# Patient Record
Sex: FEMALE | Race: WHITE | NOT HISPANIC OR LATINO | Employment: STUDENT | URBAN - NONMETROPOLITAN AREA
[De-identification: names, ages, dates, MRNs, and addresses within clinical notes are randomized per-mention and may not be internally consistent; named-entity substitution may affect disease eponyms.]

---

## 2017-01-24 ENCOUNTER — OFFICE VISIT (OUTPATIENT)
Dept: SURGERY | Facility: CLINIC | Age: 32
End: 2017-01-24

## 2017-01-24 VITALS
DIASTOLIC BLOOD PRESSURE: 80 MMHG | WEIGHT: 194 LBS | BODY MASS INDEX: 31.18 KG/M2 | HEIGHT: 66 IN | SYSTOLIC BLOOD PRESSURE: 124 MMHG

## 2017-01-24 DIAGNOSIS — N63.0 LUMP OR MASS IN BREAST: ICD-10-CM

## 2017-01-24 DIAGNOSIS — N63.0 BREAST MASS: Primary | ICD-10-CM

## 2017-01-24 PROCEDURE — 99213 OFFICE O/P EST LOW 20 MIN: CPT | Performed by: SURGERY

## 2017-01-24 NOTE — MR AVS SNAPSHOT
Mindy Helm   2017 10:00 AM   Office Visit    Dept Phone:  [phone]   Encounter #:  [id number]    Provider:  Maynor Grace MD   Department:  [facility] GENERAL SURGERY                Your Full Care Plan              Today's Medication Changes          These changes are accurate as of: 17 11:04 AM.  If you have any questions, ask your nurse or doctor.               Stop taking medication(s)listed here:     ketotifen 0.025 % ophthalmic solution   Commonly known as:  ZADITOR   Stopped by:  Maynor Grace MD                      Your Updated Medication List          This list is accurate as of: 17 11:04 AM.  Always use your most recent med list.                fluticasone 50 MCG/ACT nasal spray   Commonly known as:  FLONASE               You Were Diagnosed With        Codes Comments    Breast mass    -  Primary ICD-10-CM: N63  ICD-9-CM: 611.72       Instructions     None    Patient Instructions History      Upcoming Appointments     Visit Type Date Time Department    OFFICE VISIT 2017 10:00 AM [facility] GEN SURGERY [facility] BREAST RIGHT LIMITED 2017  3:30 PM [facility] WOMENS CTR [facility]    OFFICE VISIT 2017  3:35 PM [facility]     [facility] Qubrit allows you to send messages to your doctor, view your test results, renew your prescriptions, schedule appointments, and more. To sign up, go to amcure and click on the Sign Up Now link in the New User? box. Enter your Qubrit Activation Code exactly as it appears below along with the last four digits of your Social Security Number and your Date of Birth () to complete the sign-up process. If you do not sign up before the expiration date, you must request a new code.    Qubrit Activation Code: [email or web address]  Expires: 2017 11:04 AM    If you have questions, you can email [email or web address] or call [phone] to talk  "to our MyChart staff. Remember, [employer] is NOT to be used for urgent needs. For medical emergencies, dial 911.               Other Info from Your Visit           Your Appointments     Jan 26, 2017  3:30 PM CST   US mad breast right limited with MAD DIAG US 1   [facility] CENTER ULTRASOUND ([facility]    [address] [place] [id number] [phone]           No prep required            Jan 27, 2017  3:35 PM CST   Office Visit with Maynor Grace MD   [facility] MEDICAL [facility] GENERAL SURGERY (--)    [address] [place] [address] Dr  [facility] [address] [place] [id number] [phone]           Arrive 15 minutes prior to appointment.              Allergies     No Known Allergies      Reason for Visit     Follow-up Phil Lymph Node Rt. Axilla/Breast      Vital Signs     Blood Pressure Height Weight Body Mass Index Smoking Status       124/80 66\" (167.6 cm) 194 lb (88 kg) 31.31 kg/m2 Current Every Day Smoker       Problems and Diagnoses Noted     Breast mass    -  Primary        "

## 2017-01-24 NOTE — PROGRESS NOTES
31-year-old female who is now 2 years out from a right breast mammotome biopsy that was found to be a reactive lymph node.  At that time patient felt something in the upper outer aspect of her right breast and we thought that this was the lymph node that we did the biopsy on.  She tells me however that the feeling did not change within the biopsy and she remains concerned about this area.  Again it did not get bigger or change in any size with the biopsy that we did.  It is not changed by her history or exam herself.  No family history of breast cancer.  No other palpable bladder bowel is no nipple discharge    Social History     Social History   • Marital status:      Spouse name: N/A   • Number of children: N/A   • Years of education: N/A     Occupational History   • Not on file.     Social History Main Topics   • Smoking status: Current Every Day Smoker   • Smokeless tobacco: Not on file   • Alcohol use Not on file   • Drug use: Not on file   • Sexual activity: Not on file     Other Topics Concern   • Not on file     Social History Narrative       Past Medical History   Diagnosis Date   • Abnormal involuntary movement      tremor left hand   •       probable complete   • Acne vulgaris    • Acute atopic conjunctivitis    • Allergic rhinitis    • Breast lump       rt breast upper outer quadrant reactive lymph node on bx      • Bruxism      teeth grinding   • Contusion      nose   • Hyperlipidemia    • Indigestion    • Malaise and fatigue    • Motion sickness    • Muscle pain    • Near syncope    • Primary fibromyalgia syndrome    • Screening for malignant neoplasm of breast    • Screening for malignant neoplasm of cervix    • Spasm of back muscles    • Tobacco dependence syndrome    • UTI (urinary tract infection)        Family History   Problem Relation Age of Onset   • Heart disease Other    • Hypertension Other        Past Surgical History   Procedure Laterality Date   • Induced    2006     Hysterotomy, that is repeat low transverse  section. Intrauterine pregnancy, 34 and 1/7th weeks gestational age, previous low transverse  section times 2 with intrauterine fetal demise   • Breast biopsy  02/10/2015     Ultrasound guided right breast mammotome biopsy with 8 gauge needle, also left a clip   •  section  2004     Repeat low transverse  section. Intrauterine pregnancy at 39 and 1/7th weeks geatational age delivery. Previous low transverse  section       Alert and appropriate   nonicteric  Right breast without any dominant masses nipple discharge or skin changes.  I examined her sitting up and   supine and do not feel a distinct mass where she feels this area.  She does have some thickened breast tissue at the site however and is in the upper outer quadrant of her right breast.  No adenopathy no nipplel discharge    Assessment and plan  Questionable right upper outer quadrant breast mass that patient feels and is not very impressive on physical exam.  We will get an ultrasound of that area of the patient follow up with us after the procedure or sooner she has any other concerns or questions

## 2017-01-27 ENCOUNTER — OFFICE VISIT (OUTPATIENT)
Dept: SURGERY | Facility: CLINIC | Age: 32
End: 2017-01-27

## 2017-01-27 VITALS
BODY MASS INDEX: 31.66 KG/M2 | HEIGHT: 66 IN | SYSTOLIC BLOOD PRESSURE: 122 MMHG | DIASTOLIC BLOOD PRESSURE: 76 MMHG | WEIGHT: 197 LBS

## 2017-01-27 DIAGNOSIS — N63.0 LUMP OR MASS IN BREAST: Primary | ICD-10-CM

## 2017-01-27 PROCEDURE — 99212 OFFICE O/P EST SF 10 MIN: CPT | Performed by: SURGERY

## 2017-01-27 RX ORDER — NEOMYCIN/POLYMYXIN B/PRAMOXINE 3.5-10K-1
CREAM (GRAM) TOPICAL
COMMUNITY
End: 2017-10-25

## 2017-01-27 NOTE — PROGRESS NOTES
31-year-old female returns after a ultrasound done of the upper outer aspect of her right breast.  Patient was concerned about a palpable area that she could feel it's been unchanged over the past 2 years.  On my exam it does not appear to be any type of dominant mass just a thickened area of breast tissue.  Patient's had a lymph node biopsied in this area in the distant past by mammotome biopsy technique.  The ultrasound films reviewed as well as report and there is not anything present in the area of concern.  Patient assures me that she told ultrasound tech look.  I went over these findings with her.  She understands.  I recommended annual breast exam with her physician and continues her self breast exam on a monthly basis.  Return to clinic she has any concerns or questions

## 2017-10-25 ENCOUNTER — OFFICE VISIT (OUTPATIENT)
Dept: FAMILY MEDICINE CLINIC | Facility: CLINIC | Age: 32
End: 2017-10-25

## 2017-10-25 VITALS
DIASTOLIC BLOOD PRESSURE: 78 MMHG | HEIGHT: 66 IN | BODY MASS INDEX: 35.6 KG/M2 | SYSTOLIC BLOOD PRESSURE: 132 MMHG | WEIGHT: 221.5 LBS | HEART RATE: 73 BPM | OXYGEN SATURATION: 98 %

## 2017-10-25 DIAGNOSIS — Z23 NEEDS FLU SHOT: ICD-10-CM

## 2017-10-25 DIAGNOSIS — J30.2 CHRONIC SEASONAL ALLERGIC RHINITIS, UNSPECIFIED TRIGGER: ICD-10-CM

## 2017-10-25 DIAGNOSIS — Z23 NEED FOR TDAP VACCINATION: ICD-10-CM

## 2017-10-25 DIAGNOSIS — Z11.1 VISIT FOR TB SKIN TEST: ICD-10-CM

## 2017-10-25 DIAGNOSIS — Z76.89 ENCOUNTER TO ESTABLISH CARE: Primary | ICD-10-CM

## 2017-10-25 PROCEDURE — 86580 TB INTRADERMAL TEST: CPT | Performed by: FAMILY MEDICINE

## 2017-10-25 PROCEDURE — 99213 OFFICE O/P EST LOW 20 MIN: CPT | Performed by: FAMILY MEDICINE

## 2017-10-25 PROCEDURE — 90656 IIV3 VACC NO PRSV 0.5 ML IM: CPT | Performed by: FAMILY MEDICINE

## 2017-10-25 PROCEDURE — 90471 IMMUNIZATION ADMIN: CPT | Performed by: FAMILY MEDICINE

## 2017-10-25 RX ORDER — FLUTICASONE PROPIONATE 50 MCG
2 SPRAY, SUSPENSION (ML) NASAL DAILY
Qty: 1 BOTTLE | Refills: 0 | Status: SHIPPED | OUTPATIENT
Start: 2017-10-25 | End: 2018-01-31 | Stop reason: SDUPTHER

## 2017-10-25 NOTE — PROGRESS NOTES
I have seen the patient.  I have reviewed the notes, assessments, and/or procedures performed by Dr. Saba, I concur with her/his documentation and assessment and plan for Mindy Helm.          This document has been electronically signed by Cleopatra Kim MD on October 25, 2017 4:58 PM

## 2017-10-25 NOTE — PROGRESS NOTES
Subjective:     Mindy Helm is a 31 y.o. female who presents for initial evaluation for establishment of care. PHQ9 of 2. Pt has a history of seasonal allergies that she takes flonase for on a PRN basis which controls her symptoms. 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 Pt to get TDAP at the health department. Pt requires a TB skin test, MMR and Hep B titers. Pt is do for a papsmear and plans on scheduling one at a future visit. Pt request refill of flonase. Pt has no other current complaints. Pt to return in 2 days for TB skin test results.    Preventative:  Over the past 2 weeks, have you felt down, depressed, or hopeless?No   Over the past 2 weeks, have you felt little interest or pleasure in doing things?No  Clinical depression screening refused by patient.No     Past Medical Hx:  Past Medical History:   Diagnosis Date   • Abnormal involuntary movement     tremor left hand   •      probable complete   • Acne vulgaris    • Acute atopic conjunctivitis    • Allergic rhinitis    • Breast lump      rt breast upper outer quadrant reactive lymph node on bx      • Bruxism     teeth grinding   • Contusion     nose   • Hyperlipidemia    • Indigestion    • Malaise and fatigue    • Motion sickness    • Muscle pain    • Near syncope    • Primary fibromyalgia syndrome    • Screening for malignant neoplasm of breast    • Screening for malignant neoplasm of cervix    • Spasm of back muscles    • Tobacco dependence syndrome    • UTI (urinary tract infection)        Past Surgical Hx:  Past Surgical History:   Procedure Laterality Date   • BREAST BIOPSY  02/10/2015    Ultrasound guided right breast mammotome biopsy with 8 gauge needle, also left a clip   •  SECTION  2004    Repeat low transverse  section. Intrauterine pregnancy at 39 and 1/7th weeks geatational age delivery. Previous low transverse  section   • INDUCED    2006    Hysterotomy, that is repeat low transverse  section. Intrauterine pregnancy, 34 and 1/7th weeks gestational age, previous low transverse  section times 2 with intrauterine fetal demise       Health Maintenance:  Health Maintenance   Topic Date Due   • PNEUMOCOCCAL VACCINE (19-64 MEDIUM RISK) (1 of 1 - PPSV23) 10/29/2003   • TDAP/TD VACCINES (1 - Tdap) 10/29/2003   • PAP SMEAR  2017   • LIPID PANEL  2017   • INFLUENZA VACCINE  Completed       Current Meds:    Current Outpatient Prescriptions:   •  fluticasone (FLONASE) 50 MCG/ACT nasal spray, 2 sprays into each nostril Daily., Disp: 1 bottle, Rfl: 0  •  Multiple Vitamin (MULTIVITAMINS PO), Take 1 tablet by mouth Daily., Disp: , Rfl:     Allergies:  Review of patient's allergies indicates no known allergies.    Family Hx:  Family History   Problem Relation Age of Onset   • Heart disease Other    • Hypertension Other         Social History:  Social History     Social History   • Marital status:      Spouse name: N/A   • Number of children: N/A   • Years of education: N/A     Occupational History   • Not on file.     Social History Main Topics   • Smoking status: Current Every Day Smoker   • Smokeless tobacco: Not on file   • Alcohol use No   • Drug use: Not on file   • Sexual activity: Not on file     Other Topics Concern   • Not on file     Social History Narrative       Review of Systems  Review of Systems   Constitutional: Negative for chills and fever.   HENT: Negative for congestion and sore throat.    Eyes: Negative for discharge and itching.   Respiratory: Negative for cough, shortness of breath and wheezing.    Cardiovascular: Negative for chest pain, palpitations and leg swelling.   Gastrointestinal: Negative for abdominal pain, diarrhea and vomiting.   Genitourinary: Negative for dysuria and hematuria.   Musculoskeletal: Negative for neck pain and neck stiffness.   Skin: Negative for rash and wound.  "  Neurological: Negative for seizures and syncope.   Psychiatric/Behavioral: Negative for agitation and confusion.         Objective:     /78 (BP Location: Left arm, Patient Position: Sitting, Cuff Size: Adult)  Pulse 73  Ht 66\" (167.6 cm)  Wt 221 lb 8 oz (100 kg)  SpO2 98%  BMI 35.75 kg/m2  Physical Exam   Constitutional: She is oriented to person, place, and time. She appears well-developed and well-nourished. No distress.   HENT:   Head: Normocephalic and atraumatic.   Right Ear: External ear normal.   Left Ear: External ear normal.   Nose: Nose normal.   Mouth/Throat: Oropharynx is clear and moist. No oropharyngeal exudate.   Eyes: Conjunctivae and EOM are normal. Pupils are equal, round, and reactive to light. Right eye exhibits no discharge. Left eye exhibits no discharge. No scleral icterus.   Neck: Normal range of motion. Neck supple.   Cardiovascular: Normal rate, regular rhythm and normal heart sounds.    Pulmonary/Chest: Effort normal and breath sounds normal. No respiratory distress. She has no wheezes. She has no rales.   Abdominal: Soft. Bowel sounds are normal. She exhibits no distension. There is no tenderness.   Musculoskeletal: Normal range of motion. She exhibits no edema.   Neurological: She is alert and oriented to person, place, and time.   Skin: Skin is warm and dry. She is not diaphoretic.   Psychiatric: She has a normal mood and affect. Her behavior is normal. Judgment and thought content normal.   Nursing note and vitals reviewed.                                                                     Assessment/Plan:     Diagnoses and all orders for this visit:    Encounter to establish care  -     Measles / Mumps / Rubella Immunity; Future  -     Hepatitis B Surface Antibody; Future  -     Hepatitis B surface antigen; Future    Chronic seasonal allergic rhinitis, unspecified trigger  -     fluticasone (FLONASE) 50 MCG/ACT nasal spray; 2 sprays into each nostril Daily.    Needs flu " shot  -     Flu Vaccine Quad PF >18YR    Need for Tdap vaccination    Visit for TB skin test  -     TB Skin Test         Follow-up:     Return in about 2 days (around 10/27/2017) for Next scheduled follow up.        GOALS:  Maintain medication compliance    Preventative:  Female Preventative: Exercises regularly  Vaccines:   Tetanus vaccine: not up to date - will obtain at health department  Annual influenza vaccine: up to date   Pneumococcal vaccine: not up to date - declined    Smoking cessation counseling was provided.  does not drink  eat more fruits and vegetables, decrease soda or juice intake, increase water intake and increase physical activity    RISK SCORE: 2    Melvin Saba MD PGY2  [place] [address]  Office: [phone]      This document has been electronically signed by Melvin Saba MD on October 27, 2017 8:23 AM

## 2017-10-27 LAB
INDURATION: 0 MM (ref 0–10)
TB SKIN TEST: NEGATIVE

## 2017-11-06 DIAGNOSIS — Z11.1 ENCOUNTER FOR PPD SKIN TEST READING: Primary | ICD-10-CM

## 2017-11-06 PROCEDURE — 86580 TB INTRADERMAL TEST: CPT | Performed by: FAMILY MEDICINE

## 2017-11-15 ENCOUNTER — TELEPHONE (OUTPATIENT)
Dept: FAMILY MEDICINE CLINIC | Facility: CLINIC | Age: 32
End: 2017-11-15

## 2017-11-15 NOTE — TELEPHONE ENCOUNTER
Patient is needing where her second tb test result was the same as the first results are not in the computer and she needs the results for school.

## 2017-11-20 ENCOUNTER — TELEPHONE (OUTPATIENT)
Dept: FAMILY MEDICINE CLINIC | Facility: CLINIC | Age: 32
End: 2017-11-20

## 2017-11-20 LAB
INDURATION: 0 MM (ref 0–10)
TB SKIN TEST: NEGATIVE

## 2017-11-20 NOTE — TELEPHONE ENCOUNTER
PT CALLED NEEDING SECOND RESULT FOR TB TEST. PT STATES SHE HAD THIS DONE 11-6-17. PT NEEDS THIS FOR SCHOOL.

## 2018-01-31 DIAGNOSIS — J30.2 CHRONIC SEASONAL ALLERGIC RHINITIS, UNSPECIFIED TRIGGER: ICD-10-CM

## 2018-01-31 RX ORDER — FLUTICASONE PROPIONATE 50 MCG
SPRAY, SUSPENSION (ML) NASAL
Qty: 1 BOTTLE | Refills: 1 | Status: SHIPPED | OUTPATIENT
Start: 2018-01-31 | End: 2020-09-10 | Stop reason: SDUPTHER

## 2018-03-13 ENCOUNTER — OFFICE VISIT (OUTPATIENT)
Dept: FAMILY MEDICINE CLINIC | Facility: CLINIC | Age: 33
End: 2018-03-13

## 2018-03-13 ENCOUNTER — LAB (OUTPATIENT)
Dept: LAB | Facility: HOSPITAL | Age: 33
End: 2018-03-13

## 2018-03-13 VITALS
DIASTOLIC BLOOD PRESSURE: 66 MMHG | WEIGHT: 229.38 LBS | HEIGHT: 66 IN | OXYGEN SATURATION: 97 % | HEART RATE: 70 BPM | SYSTOLIC BLOOD PRESSURE: 116 MMHG | BODY MASS INDEX: 36.86 KG/M2

## 2018-03-13 DIAGNOSIS — J06.9 VIRAL URI WITH COUGH: Primary | ICD-10-CM

## 2018-03-13 DIAGNOSIS — Z00.00 ANNUAL PHYSICAL EXAM: ICD-10-CM

## 2018-03-13 LAB
25(OH)D3 SERPL-MCNC: 24.7 NG/ML (ref 30–100)
ALBUMIN SERPL-MCNC: 4.2 G/DL (ref 3.4–4.8)
ALBUMIN/GLOB SERPL: 1.2 G/DL (ref 1.1–1.8)
ALP SERPL-CCNC: 72 U/L (ref 38–126)
ALT SERPL W P-5'-P-CCNC: 32 U/L (ref 9–52)
ANION GAP SERPL CALCULATED.3IONS-SCNC: 12 MMOL/L (ref 5–15)
ARTICHOKE IGE QN: 145 MG/DL (ref 1–129)
AST SERPL-CCNC: 23 U/L (ref 14–36)
BASOPHILS # BLD AUTO: 0.02 10*3/MM3 (ref 0–0.2)
BASOPHILS NFR BLD AUTO: 0.2 % (ref 0–2)
BILIRUB SERPL-MCNC: 0.2 MG/DL (ref 0.2–1.3)
BUN BLD-MCNC: 7 MG/DL (ref 7–21)
BUN/CREAT SERPL: 9.2 (ref 7–25)
CALCIUM SPEC-SCNC: 9.1 MG/DL (ref 8.4–10.2)
CHLORIDE SERPL-SCNC: 99 MMOL/L (ref 95–110)
CHOLEST SERPL-MCNC: 214 MG/DL (ref 0–199)
CO2 SERPL-SCNC: 28 MMOL/L (ref 22–31)
CREAT BLD-MCNC: 0.76 MG/DL (ref 0.5–1)
DEPRECATED RDW RBC AUTO: 39 FL (ref 36.4–46.3)
EOSINOPHIL # BLD AUTO: 0.24 10*3/MM3 (ref 0–0.7)
EOSINOPHIL NFR BLD AUTO: 2.6 % (ref 0–7)
ERYTHROCYTE [DISTWIDTH] IN BLOOD BY AUTOMATED COUNT: 12.9 % (ref 11.5–14.5)
GFR SERPL CREATININE-BSD FRML MDRD: 88 ML/MIN/1.73 (ref 60–149)
GLOBULIN UR ELPH-MCNC: 3.6 GM/DL (ref 2.3–3.5)
GLUCOSE BLD-MCNC: 91 MG/DL (ref 60–100)
HCT VFR BLD AUTO: 38.3 % (ref 35–45)
HDLC SERPL-MCNC: 33 MG/DL (ref 60–200)
HGB BLD-MCNC: 13 G/DL (ref 12–15.5)
IMM GRANULOCYTES # BLD: 0.01 10*3/MM3 (ref 0–0.02)
IMM GRANULOCYTES NFR BLD: 0.1 % (ref 0–0.5)
LDLC/HDLC SERPL: 4.34 {RATIO} (ref 0–3.22)
LYMPHOCYTES # BLD AUTO: 3.07 10*3/MM3 (ref 0.6–4.2)
LYMPHOCYTES NFR BLD AUTO: 32.7 % (ref 10–50)
MCH RBC QN AUTO: 28.3 PG (ref 26.5–34)
MCHC RBC AUTO-ENTMCNC: 33.9 G/DL (ref 31.4–36)
MCV RBC AUTO: 83.4 FL (ref 80–98)
MONOCYTES # BLD AUTO: 0.53 10*3/MM3 (ref 0–0.9)
MONOCYTES NFR BLD AUTO: 5.7 % (ref 0–12)
NEUTROPHILS # BLD AUTO: 5.51 10*3/MM3 (ref 2–8.6)
NEUTROPHILS NFR BLD AUTO: 58.7 % (ref 37–80)
PLATELET # BLD AUTO: 334 10*3/MM3 (ref 150–450)
PMV BLD AUTO: 9.9 FL (ref 8–12)
POTASSIUM BLD-SCNC: 3.9 MMOL/L (ref 3.5–5.1)
PROT SERPL-MCNC: 7.8 G/DL (ref 6.3–8.6)
RBC # BLD AUTO: 4.59 10*6/MM3 (ref 3.77–5.16)
SODIUM BLD-SCNC: 139 MMOL/L (ref 137–145)
TRIGL SERPL-MCNC: 189 MG/DL (ref 20–199)
WBC NRBC COR # BLD: 9.38 10*3/MM3 (ref 3.2–9.8)

## 2018-03-13 PROCEDURE — 36415 COLL VENOUS BLD VENIPUNCTURE: CPT | Performed by: FAMILY MEDICINE

## 2018-03-13 PROCEDURE — 80061 LIPID PANEL: CPT

## 2018-03-13 PROCEDURE — 85025 COMPLETE CBC W/AUTO DIFF WBC: CPT | Performed by: FAMILY MEDICINE

## 2018-03-13 PROCEDURE — 99214 OFFICE O/P EST MOD 30 MIN: CPT | Performed by: FAMILY MEDICINE

## 2018-03-13 PROCEDURE — 80053 COMPREHEN METABOLIC PANEL: CPT | Performed by: FAMILY MEDICINE

## 2018-03-13 PROCEDURE — 82306 VITAMIN D 25 HYDROXY: CPT

## 2018-03-13 RX ORDER — ECHINACEA PURPUREA EXTRACT 125 MG
1 TABLET ORAL 4 TIMES DAILY
Refills: 0 | COMMUNITY
Start: 2018-02-11

## 2018-03-13 RX ORDER — GUAIFENESIN 600 MG/1
1200 TABLET, EXTENDED RELEASE ORAL EVERY 12 HOURS SCHEDULED
Qty: 30 TABLET | Refills: 0 | Status: SHIPPED | OUTPATIENT
Start: 2018-03-13

## 2018-03-13 NOTE — PROGRESS NOTES
Subjective:     Mindy Helm is a 32 y.o. female who presents for initial evaluation for 3-4 day history of cough and congestion. Pt reports that she has been taking mucinex, flonase and saline nose spray. Pt denies cough, body aches, postnasal drip, rhinorrhea, fever, nausea or vomiting. Pt requests refill of her mucinex. Pt counseled likely a viral URI and counseled on supportive care measures. Pt requests this visit also be her annual visit and requests her yearly blood work. Pt is agreeable to CBC, CMP and lipid panel. Pt agreeable to scheduling next appointment for pap smear.    Preventative:  Over the past 2 weeks, have you felt down, depressed, or hopeless?No   Over the past 2 weeks, have you felt little interest or pleasure in doing things?No  Clinical depression screening refused by patient.No     Past Medical Hx:  Past Medical History:   Diagnosis Date   • Abnormal involuntary movement     tremor left hand   •      probable complete   • Acne vulgaris    • Acute atopic conjunctivitis    • Allergic rhinitis    • Breast lump      rt breast upper outer quadrant reactive lymph node on bx      • Bruxism     teeth grinding   • Contusion     nose   • Hyperlipidemia    • Indigestion    • Malaise and fatigue    • Motion sickness    • Muscle pain    • Near syncope    • Primary fibromyalgia syndrome    • Screening for malignant neoplasm of breast    • Screening for malignant neoplasm of cervix    • Spasm of back muscles    • Tobacco dependence syndrome    • UTI (urinary tract infection)        Past Surgical Hx:  Past Surgical History:   Procedure Laterality Date   • BREAST BIOPSY  02/10/2015    Ultrasound guided right breast mammotome biopsy with 8 gauge needle, also left a clip   •  SECTION  2004    Repeat low transverse  section. Intrauterine pregnancy at 39 and 1/7th weeks geatational age delivery. Previous low transverse  section   • INDUCED   2006     Hysterotomy, that is repeat low transverse  section. Intrauterine pregnancy, 34 and 1/7th weeks gestational age, previous low transverse  section times 2 with intrauterine fetal demise       Health Maintenance:  Health Maintenance   Topic Date Due   • TDAP/TD VACCINES (1 - Tdap) 10/29/2003   • PAP SMEAR  2017   • PNEUMOCOCCAL VACCINE (19-64 MEDIUM RISK) (1 of 1 - PPSV23) 2019 (Originally 10/29/2003)   • LIPID PANEL  2019   • INFLUENZA VACCINE  Completed       Current Meds:    Current Outpatient Prescriptions:   •  fluticasone (FLONASE) 50 MCG/ACT nasal spray, 2 SPRAYS IN EACH NOSTRIL ONCE DAILY, Disp: 1 bottle, Rfl: 1  •  KRILL OIL PO, Take 1 tablet by mouth Daily., Disp: , Rfl:   •  Multiple Vitamin (MULTIVITAMINS PO), Take 1 tablet by mouth Daily., Disp: , Rfl:   •  sodium chloride (OCEAN) 0.65 % nasal spray, 1 spray into each nostril 4 (Four) Times a Day., Disp: , Rfl: 0  •  guaiFENesin (MUCINEX) 600 MG 12 hr tablet, Take 2 tablets by mouth Every 12 (Twelve) Hours., Disp: 30 tablet, Rfl: 0    Allergies:  Review of patient's allergies indicates no known allergies.    Family Hx:  Family History   Problem Relation Age of Onset   • Heart disease Other    • Hypertension Other         Social History:  Social History     Social History   • Marital status:      Spouse name: N/A   • Number of children: N/A   • Years of education: N/A     Occupational History   • Not on file.     Social History Main Topics   • Smoking status: Current Every Day Smoker   • Smokeless tobacco: Not on file   • Alcohol use No   • Drug use: Unknown   • Sexual activity: Not on file     Other Topics Concern   • Not on file     Social History Narrative   • No narrative on file       Review of Systems  Review of Systems   Constitutional: Negative for chills and fever.   HENT: Positive for congestion. Negative for rhinorrhea.    Eyes: Negative for photophobia and visual disturbance.   Respiratory: Positive for  "cough. Negative for shortness of breath and wheezing.    Cardiovascular: Negative for chest pain and palpitations.   Gastrointestinal: Negative for abdominal pain, diarrhea, nausea and vomiting.   Genitourinary: Negative for dysuria and hematuria.   Musculoskeletal: Negative for neck pain and neck stiffness.   Skin: Negative for rash and wound.   Neurological: Negative for seizures and syncope.   Psychiatric/Behavioral: Negative for agitation and confusion.         Objective:     /66 (BP Location: Left arm, Patient Position: Sitting, Cuff Size: Large Adult)   Pulse 70   Ht 167.6 cm (66\")   Wt 104 kg (229 lb 6 oz)   SpO2 97%   BMI 37.02 kg/m²   Physical Exam   Constitutional: She is oriented to person, place, and time. She appears well-developed and well-nourished. No distress.   HENT:   Head: Normocephalic and atraumatic.   Right Ear: External ear normal.   Left Ear: External ear normal.   Nose: Nose normal.   Mouth/Throat: Oropharynx is clear and moist. No oropharyngeal exudate.   Eyes: Conjunctivae and EOM are normal. Pupils are equal, round, and reactive to light. Right eye exhibits no discharge. Left eye exhibits no discharge. No scleral icterus.   Neck: Normal range of motion. Neck supple. No thyromegaly present.   Cardiovascular: Normal rate and normal heart sounds.    Pulmonary/Chest: Effort normal and breath sounds normal. No respiratory distress. She has no wheezes. She has no rales.   Abdominal: Soft. Bowel sounds are normal. There is no tenderness.   Musculoskeletal: Normal range of motion. She exhibits no edema.   Lymphadenopathy:     She has no cervical adenopathy.   Neurological: She is alert and oriented to person, place, and time.   Skin: Skin is warm and dry. She is not diaphoretic.   Psychiatric: She has a normal mood and affect. Her behavior is normal. Judgment and thought content normal.   Nursing note and vitals reviewed.                                                                "      Assessment/Plan:     Diagnoses and all orders for this visit:    Viral URI with cough  -     guaiFENesin (MUCINEX) 600 MG 12 hr tablet; Take 2 tablets by mouth Every 12 (Twelve) Hours.  -     CBC & Differential    Annual physical exam  -     Comprehensive Metabolic Panel  -     Lipid panel; Future  -     Vitamin D 25 hydroxy; Future  -     CBC & Differential       Follow-up:     Return in about 4 weeks (around 4/10/2018) for Next scheduled follow up PAP.        GOALS:  Maintain medication compliance    Preventative:  Female Preventative: Cholesterol screening is up to date  Vaccines:   Tetanus vaccine: up to date  Annual influenza vaccine: up to date     Smoking cessation counseling was provided.  does not drink  eat more fruits and vegetables, decrease soda or juice intake, increase water intake and increase physical activity    RISK SCORE: 3    Melvin Saba MD PGY2  [place] [address]  Office: [phone]      This document has been electronically signed by Melvin Saba MD on March 16, 2018 1:36 PM

## 2018-03-19 ENCOUNTER — TELEPHONE (OUTPATIENT)
Dept: FAMILY MEDICINE CLINIC | Facility: CLINIC | Age: 33
End: 2018-03-19

## 2020-09-10 ENCOUNTER — OFFICE VISIT (OUTPATIENT)
Dept: FAMILY MEDICINE CLINIC | Facility: CLINIC | Age: 35
End: 2020-09-10

## 2020-09-10 ENCOUNTER — LAB (OUTPATIENT)
Dept: LAB | Facility: HOSPITAL | Age: 35
End: 2020-09-10

## 2020-09-10 VITALS
HEIGHT: 66 IN | HEART RATE: 71 BPM | OXYGEN SATURATION: 98 % | SYSTOLIC BLOOD PRESSURE: 128 MMHG | DIASTOLIC BLOOD PRESSURE: 74 MMHG | BODY MASS INDEX: 42.01 KG/M2 | WEIGHT: 261.4 LBS | TEMPERATURE: 96.8 F

## 2020-09-10 DIAGNOSIS — J30.2 CHRONIC SEASONAL ALLERGIC RHINITIS: ICD-10-CM

## 2020-09-10 DIAGNOSIS — Z11.1 VISIT FOR TB SKIN TEST: Primary | ICD-10-CM

## 2020-09-10 DIAGNOSIS — Z00.00 PHYSICAL EXAM, ANNUAL: ICD-10-CM

## 2020-09-10 PROCEDURE — 99395 PREV VISIT EST AGE 18-39: CPT | Performed by: STUDENT IN AN ORGANIZED HEALTH CARE EDUCATION/TRAINING PROGRAM

## 2020-09-10 PROCEDURE — 36415 COLL VENOUS BLD VENIPUNCTURE: CPT

## 2020-09-10 PROCEDURE — 86787 VARICELLA-ZOSTER ANTIBODY: CPT

## 2020-09-10 PROCEDURE — 83721 ASSAY OF BLOOD LIPOPROTEIN: CPT

## 2020-09-10 PROCEDURE — 80061 LIPID PANEL: CPT

## 2020-09-10 PROCEDURE — 86803 HEPATITIS C AB TEST: CPT

## 2020-09-10 RX ORDER — FLUTICASONE PROPIONATE 50 MCG
2 SPRAY, SUSPENSION (ML) NASAL DAILY
Qty: 1 BOTTLE | Refills: 1 | Status: SHIPPED | OUTPATIENT
Start: 2020-09-10

## 2020-09-10 NOTE — PATIENT INSTRUCTIONS
Please proceed to the [facility], or your local health department to have the following vaccines administered:  Td, Influenza and Prevnar   TB skin test.     Once administered, please provide a copy from the health department to the [facility] to update your medical record.

## 2020-09-11 DIAGNOSIS — E78.2 MIXED DYSLIPIDEMIA: Primary | ICD-10-CM

## 2020-09-11 LAB
ARTICHOKE IGE QN: 148 MG/DL (ref 0–100)
CHOLEST SERPL-MCNC: 246 MG/DL (ref 0–200)
HCV AB SER DONR QL: NORMAL
HDLC SERPL-MCNC: 33 MG/DL (ref 40–60)
LDLC SERPL CALC-MCNC: ABNORMAL MG/DL
LDLC/HDLC SERPL: ABNORMAL {RATIO}
TRIGL SERPL-MCNC: 512 MG/DL (ref 0–150)
VLDLC SERPL-MCNC: ABNORMAL MG/DL
VZV IGG SER IA-ACNC: POSITIVE

## 2020-09-11 RX ORDER — CHOLESTYRAMINE LIGHT 4 G/5.7G
4 POWDER, FOR SUSPENSION ORAL 2 TIMES DAILY
Qty: 60 EACH | Refills: 3 | Status: SHIPPED | OUTPATIENT
Start: 2020-09-11

## 2020-09-11 NOTE — PROGRESS NOTES
I have seen the patient.  I have reviewed the notes, assessments, and/or procedures performed by Inez Easley MD, I concur with her/his documentation and assessment and plan for Mindy Helm.               This document has been electronically signed by Marco Yuan MD on September 11, 2020 16:03

## 2020-09-11 NOTE — PROGRESS NOTES
Family Medicine Residency  Inez Easley MD    Subjective:     Mindy Helm is a 34 y.o. 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    Patient stated that she needs to have a TB skin test per her school as well as her varicella titers to complete her admission.  Patient states that she has already started classes, and they have made the exception due to Covid to accept some of these requirements later than normal.  Patient otherwise has no issues.  She states that she had chickenpox as a child, and has never had a PPD.    Patient was also reminded of some healthcare maintenance needs pending.  Details discussed below.    The following portions of the patient's history were reviewed and updated as appropriate: allergies, current medications, past family history, past medical history, past social history, past surgical history and problem list.    Past Medical Hx:  Past Medical History:   Diagnosis Date   • Abnormal involuntary movement     tremor left hand   •      probable complete   • Acne vulgaris    • Acute atopic conjunctivitis    • Allergic rhinitis    • Breast lump      rt breast upper outer quadrant reactive lymph node on bx      • Bruxism     teeth grinding   • Contusion     nose   • Hyperlipidemia    • Indigestion    • Malaise and fatigue    • Motion sickness    • Muscle pain    • Near syncope    • Primary fibromyalgia syndrome    • Screening for malignant neoplasm of breast    • Screening for malignant neoplasm of cervix    • Spasm of back muscles    • Tobacco dependence syndrome    • UTI (urinary tract infection)        Past Surgical Hx:  Past Surgical History:   Procedure Laterality Date   • BREAST BIOPSY  02/10/2015    Ultrasound guided right breast mammotome biopsy with 8 gauge needle, also left a clip   •  SECTION  2004    Repeat low transverse  section. Intrauterine pregnancy at 39 and 1/7th weeks geatational age  delivery. Previous low transverse  section   • INDUCED   2006    Hysterotomy, that is repeat low transverse  section. Intrauterine pregnancy, 34 and 1/7th weeks gestational age, previous low transverse  section times 2 with intrauterine fetal demise       Current Meds:    Current Outpatient Medications:   •  fluticasone (FLONASE) 50 MCG/ACT nasal spray, 2 sprays by Each Nare route Daily., Disp: 1 bottle, Rfl: 1  •  KRILL OIL PO, Take 1 tablet by mouth Daily., Disp: , Rfl:   •  Multiple Vitamin (MULTIVITAMINS PO), Take 1 tablet by mouth Daily., Disp: , Rfl:   •  sodium chloride (OCEAN) 0.65 % nasal spray, 1 spray into each nostril 4 (Four) Times a Day., Disp: , Rfl: 0  •  guaiFENesin (MUCINEX) 600 MG 12 hr tablet, Take 2 tablets by mouth Every 12 (Twelve) Hours., Disp: 30 tablet, Rfl: 0    Allergies:  No Known Allergies    Family Hx:  Family History   Problem Relation Age of Onset   • Heart disease Other    • Hypertension Other         Social History:  Social History     Socioeconomic History   • Marital status:      Spouse name: Not on file   • Number of children: Not on file   • Years of education: Not on file   • Highest education level: Not on file   Tobacco Use   • Smoking status: Current Every Day Smoker   Substance and Sexual Activity   • Alcohol use: No       Review of Systems  Review of Systems   Constitutional: Negative for chills and fever.   HENT: Negative for rhinorrhea and sore throat.    Eyes: Negative for photophobia and visual disturbance.   Respiratory: Negative for cough and shortness of breath.    Cardiovascular: Negative for chest pain and palpitations.   Gastrointestinal: Negative for abdominal pain and nausea.   Genitourinary: Negative for difficulty urinating and dyspareunia.   Musculoskeletal: Negative for arthralgias and back pain.   Neurological: Negative for light-headedness and headaches.   Psychiatric/Behavioral: Negative for dysphoric mood  "and sleep disturbance.   All other systems reviewed and are negative.      Objective:     /74   Pulse 71   Temp 96.8 °F (36 °C)   Ht 167.6 cm (66\")   Wt 119 kg (261 lb 6.4 oz)   SpO2 98%   BMI 42.19 kg/m²   Physical Exam   Constitutional: She is oriented to person, place, and time. Vital signs are normal. She appears well-developed and well-nourished. Face mask in place.   HENT:   Head: Normocephalic.   Right Ear: Hearing and external ear normal.   Left Ear: Hearing and external ear normal.   Nose: Nose normal.   Mouth/Throat: Mucous membranes are normal.   Eyes: Pupils are equal, round, and reactive to light. Conjunctivae and lids are normal.   Cardiovascular: Normal rate, regular rhythm, normal heart sounds and normal pulses. Exam reveals no gallop and no friction rub.   No murmur heard.  Pulmonary/Chest: Effort normal and breath sounds normal. She has no wheezes. She has no rhonchi. She has no rales.   Abdominal: Soft. Bowel sounds are normal.   Neurological: She is alert and oriented to person, place, and time.   Skin: Skin is warm.   Psychiatric: She has a normal mood and affect. Her speech is normal and behavior is normal. Judgment and thought content normal. Cognition and memory are normal.   Vitals reviewed.       Assessment/Plan:     Mindy was seen today for tb test.    Diagnoses and all orders for this visit:    Visit for TB skin test  -     Cancel: TB Skin Test    Chronic seasonal allergic rhinitis  -     fluticasone (FLONASE) 50 MCG/ACT nasal spray; 2 sprays by Each Nare route Daily.    Physical exam, annual  -     Varicella zoster antibody, IgG; Future  -     Lipid panel; Future  -     Hepatitis C antibody; Future      1. TB skin test could not be performed, clinic did not have any more available.  Patient was instructed to go to the health department to receive that, or she could go to the [facility] urgent care to have it placed and read by Saturday.  Information was reiterated in " the AVS.    2.   Labs as above for her annual exam.  Health maintenance was discussed, and information of vaccines required to be obtained at the health department were provided in AVS.  Patient also needed a refill on her Flonase, she states that she usually just does 1 spray in each nostril daily and it works well. Patient understood and acknowledged education, counseling, medication/treatment benefits and side effects. All questions were answered and addressed.     · Rx changes: None.  · Patient Education: As above.  · Compliance at present is estimated to be good.   Efforts to improve compliance (if necessary) will be directed at Continued physician-patient relationship.     Follow-up:     Return in about 1 month (around 10/10/2020) for Annual.    [Follow up plan: Annual exam, labs, schedule Pap smear, follow-up on health department vaccines.].    Preventative:  Health Maintenance   Topic Date Due   • ANNUAL PHYSICAL  10/29/1987   • PNEUMOCOCCAL VACCINE (19-64 MEDIUM RISK) (1 of 1 - PPSV23) 10/29/2003   • HEPATITIS C SCREENING  04/24/2017   • PAP SMEAR  04/24/2017   • INFLUENZA VACCINE  08/01/2020   • LIPID PANEL  09/10/2021   • TDAP/TD VACCINES (2 - Td) 11/02/2027     Female Preventative: PAP is due at future visit.  Last PAP was 3/10/2014  Recommended: Td, Influenza and Prevnar  Vaccine Counseling: Advised to go to the health department to obtain these.    Weight  -Class: Obese Class III extreme obesity: > or equal to 40kg/m2  -Patient's Body mass index is 42.19 kg/m². BMI is above normal parameters. Recommendations include: exercise counseling, nutrition counseling and referral to primary care.   eat more fruits and vegetables, decrease soda or juice intake, increase water intake, increase physical activity, reduce portion size, cut out extra servings and have 3 meals a day    Alcohol use:  reports that she does not drink alcohol.  Nicotine status  reports that she has been smoking. She does not have any  smokeless tobacco history on file.    Goals    None         RISK SCORE: 2        This document has been electronically signed by Inez Easley MD on September 11, 2020 09:27     Part of this note may be an electronic transcription/translation of spoken language to printed text using the Dragon Dictation System.

## 2020-09-15 ENCOUNTER — TELEPHONE (OUTPATIENT)
Dept: FAMILY MEDICINE CLINIC | Facility: CLINIC | Age: 35
End: 2020-09-15

## 2020-09-15 NOTE — TELEPHONE ENCOUNTER
PATIENT CALLED ASKING ABOUT THE PAPERWORK FOR THE TIDER FOR CHICKEN POX SHE RECEIVED AND WANTED TO KNOW THE STATUS OF THE LETTER.    CALL BACK NUMBER FOR PATIENT [phone].    THANKS,  TYRA

## 2020-09-16 ENCOUNTER — TELEPHONE (OUTPATIENT)
Dept: FAMILY MEDICINE CLINIC | Facility: CLINIC | Age: 35
End: 2020-09-16

## 2020-09-16 NOTE — TELEPHONE ENCOUNTER
If you have a chance, tell her I've printed the letter she can come by and pick it up for school. Every time I call her it goes to voicemail.     Thank you.      This document has been electronically signed by Inez Easley MD on September 16, 2020 13:32 CDT

## 2020-09-16 NOTE — TELEPHONE ENCOUNTER
I have released her results via my chart.  And attached a copy from her lab results.  I told her if she had any issues she can call me back.  Thank you.      This document has been electronically signed by Inez Easley MD on September 16, 2020 09:08 CDT

## 2020-09-16 NOTE — TELEPHONE ENCOUNTER
Attempted to call patient to notify the requested letter regarding her titer on Varicella Zoster is ready for pick-up. Letter is up front at  for patient.       TEVIN Matt MA.    9/16/20

## 2020-09-16 NOTE — TELEPHONE ENCOUNTER
PATIENT CALLED AND IS NEEDING A LETTER FOR A TIDER FOR CHICKEN POX. SHE IS NEEDING THIS LETTER ASA. HER NUMBER TO CALL BACK [phone].        THANK YOU,      SHERIE

## 2021-05-17 ENCOUNTER — APPOINTMENT (OUTPATIENT)
Dept: GENERAL RADIOLOGY | Facility: HOSPITAL | Age: 36
End: 2021-05-17

## 2021-05-17 ENCOUNTER — HOSPITAL ENCOUNTER (EMERGENCY)
Facility: HOSPITAL | Age: 36
Discharge: HOME | End: 2021-05-17
Attending: STUDENT IN AN ORGANIZED HEALTH CARE EDUCATION/TRAINING PROGRAM | Admitting: STUDENT IN AN ORGANIZED HEALTH CARE EDUCATION/TRAINING PROGRAM

## 2021-05-17 VITALS
BODY MASS INDEX: 39.37 KG/M2 | DIASTOLIC BLOOD PRESSURE: 82 MMHG | OXYGEN SATURATION: 96 % | HEART RATE: 74 BPM | SYSTOLIC BLOOD PRESSURE: 138 MMHG | RESPIRATION RATE: 16 BRPM | WEIGHT: 245 LBS | TEMPERATURE: 97.5 F | HEIGHT: 66 IN
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
## 2021-05-17 PROCEDURE — 72040 X-RAY EXAM NECK SPINE 2-3 VW: CPT

## 2021-05-17 PROCEDURE — 73030 X-RAY EXAM OF SHOULDER: CPT

## 2021-05-17 PROCEDURE — 99282 EMERGENCY DEPT VISIT SF MDM: CPT
